# Patient Record
(demographics unavailable — no encounter records)

---

## 2025-07-21 NOTE — ASSESSMENT
[FreeTextEntry1] : 47-year-old female here for evaluation of right elbow pain.  Patient reports an aching intermittent/episodic pain on the dorsal aspect of the right elbow/forearm that which worsens with lifting and pushing motions.  Denies any trauma or falls.  Denies any numbness or tingling.  Physical examination of the right elbow: full active range of motion of the right shoulder, wrist and fingers full active range of motion of the right elbow Tenderness to palpation of the lateral epicondyle and proximal extensor supinator mass pain with resisted wrist extension and forearm supination 4/5 strength in supination and wrist extension, otherwise 5/5 strength median/ulnar/radial sensation intact to light touch ain/pin/ulnar motor intact palpable pulses CR<2s  X-rays of the right elbow taken in the office today:  No acute fractures, subluxations, or dislocations.  The patient was advised of the diagnosis. The natural history of the pathology was explained in full to the patient in layman's terms. All questions were answered. The risks and benefits of surgical and non-surgical treatment alternatives were explained in full to the patient. We discussed treatment options including nsaids, topical gels, tennis elbow strap, PT, activity modification, cortisone inj, sx .pt is aware that symptoms usually resolve on their own in 95-99% of people, but the timeframe is unknown. Home exercises/stretches were demonstrated and the patient practiced them as well- They are to do the exercises hourly and hold the stretch for 30 seconds.  Avoid repetitive wrist flexion time was spent instructing the patient on appropriate placement of the elbow strap as well.  All questions and concerns addressed to patient's satisfaction. Patient expresses full understanding of treatment plan. I will have the patient follow-up with Dr. Head in 3 months for repeat evaluation and treatment.